# Patient Record
Sex: MALE | Race: WHITE | NOT HISPANIC OR LATINO | URBAN - METROPOLITAN AREA
[De-identification: names, ages, dates, MRNs, and addresses within clinical notes are randomized per-mention and may not be internally consistent; named-entity substitution may affect disease eponyms.]

---

## 2018-02-27 ENCOUNTER — EMERGENCY (EMERGENCY)
Facility: HOSPITAL | Age: 78
LOS: 1 days | End: 2018-02-27
Payer: MEDICARE

## 2018-02-27 PROCEDURE — 70450 CT HEAD/BRAIN W/O DYE: CPT | Mod: 26

## 2018-02-27 PROCEDURE — 71046 X-RAY EXAM CHEST 2 VIEWS: CPT | Mod: 26

## 2018-02-27 PROCEDURE — 93971 EXTREMITY STUDY: CPT | Mod: 26,LT

## 2018-02-27 PROCEDURE — 73564 X-RAY EXAM KNEE 4 OR MORE: CPT | Mod: 26,LT

## 2018-02-27 PROCEDURE — 99285 EMERGENCY DEPT VISIT HI MDM: CPT

## 2018-09-05 ENCOUNTER — OUTPATIENT (OUTPATIENT)
Dept: OUTPATIENT SERVICES | Facility: HOSPITAL | Age: 78
LOS: 1 days | End: 2018-09-05

## 2019-03-08 ENCOUNTER — OUTPATIENT (OUTPATIENT)
Dept: OUTPATIENT SERVICES | Facility: HOSPITAL | Age: 79
LOS: 1 days | End: 2019-03-08

## 2020-07-28 ENCOUNTER — OUTPATIENT (OUTPATIENT)
Dept: OUTPATIENT SERVICES | Facility: HOSPITAL | Age: 80
LOS: 1 days | End: 2020-07-28

## 2021-02-24 ENCOUNTER — OUTPATIENT (OUTPATIENT)
Dept: OUTPATIENT SERVICES | Facility: HOSPITAL | Age: 81
LOS: 1 days | End: 2021-02-24

## 2021-03-01 PROBLEM — Z00.00 ENCOUNTER FOR PREVENTIVE HEALTH EXAMINATION: Status: ACTIVE | Noted: 2021-03-01

## 2021-03-04 ENCOUNTER — APPOINTMENT (OUTPATIENT)
Dept: CARDIOLOGY | Facility: CLINIC | Age: 81
End: 2021-03-04
Payer: MEDICARE

## 2021-03-04 ENCOUNTER — NON-APPOINTMENT (OUTPATIENT)
Age: 81
End: 2021-03-04

## 2021-03-04 VITALS
OXYGEN SATURATION: 98 % | WEIGHT: 198 LBS | SYSTOLIC BLOOD PRESSURE: 118 MMHG | BODY MASS INDEX: 25.41 KG/M2 | HEIGHT: 74 IN | HEART RATE: 55 BPM | DIASTOLIC BLOOD PRESSURE: 62 MMHG | TEMPERATURE: 97.4 F

## 2021-03-04 DIAGNOSIS — Z80.3 FAMILY HISTORY OF MALIGNANT NEOPLASM OF BREAST: ICD-10-CM

## 2021-03-04 DIAGNOSIS — Z78.9 OTHER SPECIFIED HEALTH STATUS: ICD-10-CM

## 2021-03-04 DIAGNOSIS — Z00.00 ENCOUNTER FOR GENERAL ADULT MEDICAL EXAMINATION W/OUT ABNORMAL FINDINGS: ICD-10-CM

## 2021-03-04 PROCEDURE — 93000 ELECTROCARDIOGRAM COMPLETE: CPT

## 2021-03-04 PROCEDURE — 99203 OFFICE O/P NEW LOW 30 MIN: CPT

## 2021-03-04 RX ORDER — OMEPRAZOLE 20 MG/1
20 TABLET, DELAYED RELEASE ORAL
Refills: 0 | Status: ACTIVE | COMMUNITY

## 2021-03-04 NOTE — PHYSICAL EXAM
[General Appearance - Well Developed] : well developed [Normal Appearance] : normal appearance [Well Groomed] : well groomed [General Appearance - Well Nourished] : well nourished [No Deformities] : no deformities [General Appearance - In No Acute Distress] : no acute distress [Normal Conjunctiva] : the conjunctiva exhibited no abnormalities [Eyelids - No Xanthelasma] : the eyelids demonstrated no xanthelasmas [Normal Oral Mucosa] : normal oral mucosa [No Oral Pallor] : no oral pallor [No Oral Cyanosis] : no oral cyanosis [Normal Jugular Venous A Waves Present] : normal jugular venous A waves present [Normal Jugular Venous V Waves Present] : normal jugular venous V waves present [No Jugular Venous Resendiz A Waves] : no jugular venous resendiz A waves [Heart Rate And Rhythm] : heart rate and rhythm were normal [Heart Sounds] : normal S1 and S2 [Murmurs] : no murmurs present [Respiration, Rhythm And Depth] : normal respiratory rhythm and effort [Exaggerated Use Of Accessory Muscles For Inspiration] : no accessory muscle use [Auscultation Breath Sounds / Voice Sounds] : lungs were clear to auscultation bilaterally [Abdomen Soft] : soft [Abdomen Tenderness] : non-tender [Abdomen Mass (___ Cm)] : no abdominal mass palpated [Abnormal Walk] : normal gait [Gait - Sufficient For Exercise Testing] : the gait was sufficient for exercise testing [Nail Clubbing] : no clubbing of the fingernails [Cyanosis, Localized] : no localized cyanosis [Petechial Hemorrhages (___cm)] : no petechial hemorrhages [Skin Color & Pigmentation] : normal skin color and pigmentation [] : no rash [No Venous Stasis] : no venous stasis [Skin Lesions] : no skin lesions [No Skin Ulcers] : no skin ulcer [No Xanthoma] : no  xanthoma was observed [Oriented To Time, Place, And Person] : oriented to person, place, and time [Affect] : the affect was normal [Mood] : the mood was normal [No Anxiety] : not feeling anxious

## 2021-03-04 NOTE — ASSESSMENT
[FreeTextEntry1] : Nathan is an 81-year-old male with medical history detailed above and active medical issues including:\par \par - Atypical chest pain, CAD risk factors.  Patient will have noninvasive testing with a Lexiscan Myoview stress test to assess for obstructive CAD, echocardiogram for LVEF, wall motion, structural heart disease,  carotid and abdominal ultrasound to assess for obstructive PAD. \par \par - Erectile dysfunction\par \par - Reflux GERD\par \par - Dizziness consistant with vertigo\par \par Patient will be seen in cardiology follow-up after noninvasive testing.\par \par Advised patient to follow active lifestyle with regular cardiovascular exercise. Patient educated on lifestyle and diet modification with low sodium low fat diet and avoidance of excessive alcohol. Patient is aware to call with any symptoms or concerns. \par \par Nathan follow-up with Alfredo Rios NP for primary care.\par \par

## 2021-03-04 NOTE — REASON FOR VISIT
[Consultation] : a consultation regarding [FreeTextEntry2] : WEBSTER, neck pain, dizziness [FreeTextEntry1] : Nathan is an 81-year-old male with history of reflux GERD, ED, depression, Lyme's, palpitations, dizziness.\par \par Patient has dyspnea with moderate exertion.  Patient has neck pain predominantly at rest lasting less than 1 minute.  Pain is nonradiating nonreproducible.  Cardiovascular review of symptoms is negative for exertional chest pain, palpitations or syncope.  No PND or orthopnea leg edema.  No bleeding or black stool.\par \par No exercise routine.  Patient is walking less than 5 minutes able to complete a treadmill stress test.  Patient will be scheduled for pharmacologic stress test.\par \par EKG 3/4/2021 sinus bradycardia 53 bpm\par \par Labs 7/28/2020 fasting cholesterol 162, HDL 43, , triglycerides 70, magnesium 2.1, normal LFT, CBC, BMP, TSH

## 2021-03-05 ENCOUNTER — APPOINTMENT (OUTPATIENT)
Dept: CARDIOLOGY | Facility: CLINIC | Age: 81
End: 2021-03-05
Payer: MEDICARE

## 2021-03-05 PROCEDURE — 93979 VASCULAR STUDY: CPT

## 2021-03-05 PROCEDURE — 93306 TTE W/DOPPLER COMPLETE: CPT

## 2021-03-05 PROCEDURE — 93880 EXTRACRANIAL BILAT STUDY: CPT

## 2021-03-09 ENCOUNTER — APPOINTMENT (OUTPATIENT)
Dept: CARDIOLOGY | Facility: CLINIC | Age: 81
End: 2021-03-09

## 2021-03-12 ENCOUNTER — APPOINTMENT (OUTPATIENT)
Dept: CARDIOLOGY | Facility: CLINIC | Age: 81
End: 2021-03-12
Payer: MEDICARE

## 2021-03-12 PROCEDURE — 78452 HT MUSCLE IMAGE SPECT MULT: CPT

## 2021-03-12 PROCEDURE — 93015 CV STRESS TEST SUPVJ I&R: CPT

## 2021-03-12 PROCEDURE — A9502: CPT

## 2021-03-19 ENCOUNTER — TRANSCRIPTION ENCOUNTER (OUTPATIENT)
Age: 81
End: 2021-03-19

## 2021-03-19 ENCOUNTER — APPOINTMENT (OUTPATIENT)
Dept: CARDIOLOGY | Facility: CLINIC | Age: 81
End: 2021-03-19
Payer: MEDICARE

## 2021-03-19 VITALS
SYSTOLIC BLOOD PRESSURE: 118 MMHG | WEIGHT: 193 LBS | HEART RATE: 51 BPM | BODY MASS INDEX: 24 KG/M2 | TEMPERATURE: 97.7 F | OXYGEN SATURATION: 96 % | HEIGHT: 75 IN | DIASTOLIC BLOOD PRESSURE: 62 MMHG

## 2021-03-19 PROCEDURE — 99214 OFFICE O/P EST MOD 30 MIN: CPT

## 2021-03-19 RX ORDER — ASPIRIN 81 MG/1
81 TABLET ORAL DAILY
Refills: 0 | Status: DISCONTINUED | COMMUNITY
End: 2021-03-19

## 2021-03-19 NOTE — ASSESSMENT
[FreeTextEntry1] : Nathan is an 81-year-old male with medical history detailed above and active medical issues including:\par \par - Atypical chest pain, normal perfusion Myoview stress test normal LVEF March 2021\par \par - Erectile dysfunction\par \par - Reflux GERD\par \par - Dizziness consistent with vertigo\par \par Patient will be seen in cardiology follow-up after 1 year with echocardiogram\par \par Advised patient to follow active lifestyle with regular cardiovascular exercise. Patient educated on lifestyle and diet modification with low sodium low fat diet and avoidance of excessive alcohol. Patient is aware to call with any symptoms or concerns. \par \par Nathan follow-up with Alfredo Rios NP for primary care.\par \par

## 2021-03-19 NOTE — REASON FOR VISIT
[Consultation] : a consultation regarding [FreeTextEntry2] : noninvasive testing for WEBSTER, neck pain, dizziness [FreeTextEntry1] : Nathan is an 81-year-old male with history of reflux GERD, ED, depression, Lyme's, palpitations, dizziness.\par \par Patient has dyspnea with moderate exertion unchanged.  Patient has neck pain predominantly at rest lasting less than 1 minute.  Pain is nonradiating nonreproducible.  Cardiovascular review of symptoms is negative for exertional chest pain, palpitations or syncope.  No PND or orthopnea leg edema.  No bleeding or black stool.\par \par No exercise routine.  Patient is walking less than 5 minutes able to complete a treadmill stress test.  Patient will be scheduled for pharmacologic stress test.\par \par Lexiscan Myoview stress test March 2021, LVEF 63%, normal perfusion, nonischemic EKG response, no chest pain, baseline sinus rhythm NSST\par \par Echocardiogram March 2021 LVEF 65%, mild buckling mitral valve leaflet with mild MR, normal RVSP\par \par Carotid and abdominal ultrasound March 2021 mild nonobstructive plaque, normal abdominal aortic size.\par \par EKG 3/4/2021 sinus bradycardia 53 bpm\par \par Labs 7/28/2020 fasting cholesterol 162, HDL 43, , triglycerides 70, magnesium 2.1, normal LFT, CBC, BMP, TSH

## 2021-03-19 NOTE — PHYSICAL EXAM
[General Appearance - Well Developed] : well developed [Normal Appearance] : normal appearance [Well Groomed] : well groomed [General Appearance - Well Nourished] : well nourished [No Deformities] : no deformities [General Appearance - In No Acute Distress] : no acute distress [Normal Conjunctiva] : the conjunctiva exhibited no abnormalities [Eyelids - No Xanthelasma] : the eyelids demonstrated no xanthelasmas [Normal Oral Mucosa] : normal oral mucosa [No Oral Pallor] : no oral pallor [No Oral Cyanosis] : no oral cyanosis [Normal Jugular Venous A Waves Present] : normal jugular venous A waves present [Normal Jugular Venous V Waves Present] : normal jugular venous V waves present [No Jugular Venous Resendiz A Waves] : no jugular venous resendiz A waves [Respiration, Rhythm And Depth] : normal respiratory rhythm and effort [Exaggerated Use Of Accessory Muscles For Inspiration] : no accessory muscle use [Auscultation Breath Sounds / Voice Sounds] : lungs were clear to auscultation bilaterally [Heart Rate And Rhythm] : heart rate and rhythm were normal [Heart Sounds] : normal S1 and S2 [Murmurs] : no murmurs present [Abdomen Soft] : soft [Abdomen Tenderness] : non-tender [Abdomen Mass (___ Cm)] : no abdominal mass palpated [Abnormal Walk] : normal gait [Gait - Sufficient For Exercise Testing] : the gait was sufficient for exercise testing [Nail Clubbing] : no clubbing of the fingernails [Cyanosis, Localized] : no localized cyanosis [Petechial Hemorrhages (___cm)] : no petechial hemorrhages [Skin Color & Pigmentation] : normal skin color and pigmentation [] : no rash [No Venous Stasis] : no venous stasis [Skin Lesions] : no skin lesions [No Skin Ulcers] : no skin ulcer [No Xanthoma] : no  xanthoma was observed [Oriented To Time, Place, And Person] : oriented to person, place, and time [Affect] : the affect was normal [Mood] : the mood was normal [No Anxiety] : not feeling anxious

## 2022-03-09 ENCOUNTER — APPOINTMENT (OUTPATIENT)
Dept: CARDIOLOGY | Facility: CLINIC | Age: 82
End: 2022-03-09

## 2022-03-24 ENCOUNTER — NON-APPOINTMENT (OUTPATIENT)
Age: 82
End: 2022-03-24

## 2022-03-24 ENCOUNTER — APPOINTMENT (OUTPATIENT)
Dept: CARDIOLOGY | Facility: CLINIC | Age: 82
End: 2022-03-24
Payer: MEDICARE

## 2022-03-24 VITALS
DIASTOLIC BLOOD PRESSURE: 70 MMHG | HEIGHT: 75 IN | OXYGEN SATURATION: 95 % | BODY MASS INDEX: 24.25 KG/M2 | HEART RATE: 71 BPM | WEIGHT: 195 LBS | TEMPERATURE: 97.8 F | SYSTOLIC BLOOD PRESSURE: 120 MMHG

## 2022-03-24 PROCEDURE — 93000 ELECTROCARDIOGRAM COMPLETE: CPT

## 2022-03-24 PROCEDURE — 99214 OFFICE O/P EST MOD 30 MIN: CPT

## 2022-03-24 NOTE — PHYSICAL EXAM
[General Appearance - Well Developed] : well developed [Normal Appearance] : normal appearance [Well Groomed] : well groomed [General Appearance - Well Nourished] : well nourished [No Deformities] : no deformities [General Appearance - In No Acute Distress] : no acute distress [Eyelids - No Xanthelasma] : the eyelids demonstrated no xanthelasmas [Normal Oral Mucosa] : normal oral mucosa [No Oral Pallor] : no oral pallor [No Oral Cyanosis] : no oral cyanosis [Normal Jugular Venous A Waves Present] : normal jugular venous A waves present [Normal Jugular Venous V Waves Present] : normal jugular venous V waves present [No Jugular Venous Resendiz A Waves] : no jugular venous resendiz A waves [Respiration, Rhythm And Depth] : normal respiratory rhythm and effort [Exaggerated Use Of Accessory Muscles For Inspiration] : no accessory muscle use [Auscultation Breath Sounds / Voice Sounds] : lungs were clear to auscultation bilaterally [Heart Rate And Rhythm] : heart rate and rhythm were normal [Heart Sounds] : normal S1 and S2 [Murmurs] : no murmurs present [Abdomen Soft] : soft [Abdomen Tenderness] : non-tender [Abdomen Mass (___ Cm)] : no abdominal mass palpated [Abnormal Walk] : normal gait [Gait - Sufficient For Exercise Testing] : the gait was sufficient for exercise testing [Nail Clubbing] : no clubbing of the fingernails [Cyanosis, Localized] : no localized cyanosis [Petechial Hemorrhages (___cm)] : no petechial hemorrhages [Skin Color & Pigmentation] : normal skin color and pigmentation [] : no rash [No Venous Stasis] : no venous stasis [Skin Lesions] : no skin lesions [No Skin Ulcers] : no skin ulcer [No Xanthoma] : no  xanthoma was observed [Oriented To Time, Place, And Person] : oriented to person, place, and time [Affect] : the affect was normal [Mood] : the mood was normal [No Anxiety] : not feeling anxious [Well Developed] : well developed [Well Nourished] : well nourished [No Acute Distress] : no acute distress [Normal Conjunctiva] : normal conjunctiva [Normal Venous Pressure] : normal venous pressure [No Carotid Bruit] : no carotid bruit [Normal S1, S2] : normal S1, S2 [No Murmur] : no murmur [No Rub] : no rub [No Gallop] : no gallop [Clear Lung Fields] : clear lung fields [Good Air Entry] : good air entry [No Respiratory Distress] : no respiratory distress  [Soft] : abdomen soft [Non Tender] : non-tender [No Masses/organomegaly] : no masses/organomegaly [Normal Bowel Sounds] : normal bowel sounds [Normal Gait] : normal gait [No Edema] : no edema [No Cyanosis] : no cyanosis [No Clubbing] : no clubbing [No Varicosities] : no varicosities [No Rash] : no rash [No Skin Lesions] : no skin lesions [Moves all extremities] : moves all extremities [No Focal Deficits] : no focal deficits [Normal Speech] : normal speech [Alert and Oriented] : alert and oriented [Normal memory] : normal memory

## 2022-03-24 NOTE — REASON FOR VISIT
[Consultation] : a consultation regarding [FreeTextEntry1] : Nathan is an 82-year-old male with history of reflux GERD, ED, depression, Lyme's, palpitations, dizziness.\par \par Patient has dyspnea with moderate exertion unchanged.  Patient has reproducible neck pain predominantly at rest lasting less than 1 minute.  Cardiovascular review of symptoms is negative for exertional chest pain, palpitations or syncope.  No PND or orthopnea leg edema.  No bleeding or black stool.\par \par No exercise routine.  Patient is walking 15 minutes without exertional chest pain\par \par Lexiscan Myoview stress test March 2021, LVEF 63%, normal perfusion, nonischemic EKG response, no chest pain, baseline sinus rhythm NSST\par \par Echocardiogram March 2021 LVEF 65%, mild buckling mitral valve leaflet with mild MR, normal RVSP\par \par Carotid and abdominal ultrasound March 2021 mild nonobstructive plaque, normal abdominal aortic size.\par \par EKG 3/4/2021 sinus bradycardia 53 bpm\par \par Labs 7/28/2020 fasting cholesterol 162, HDL 43, , triglycerides 70, magnesium 2.1, normal LFT, CBC, BMP, TSH [FreeTextEntry2] : noninvasive testing for WEBSTER, neck pain, dizziness

## 2022-03-24 NOTE — ASSESSMENT
[FreeTextEntry1] : Nathan is an 82-year-old male with medical history detailed above and active medical issues including:\par \par - Atypical chest pain, normal perfusion Myoview stress test normal LVEF March 2021\par \par - Recurrent reproducible neck pain, follow-up with PCP, orthopedic surgery\par \par - Erectile dysfunction\par \par - Reflux GERD\par \par - Dizziness consistent with vertigo\par \par Echocardiogram ordered to evaluate for structural heart disease, carotid and abdominal ultrasound to evaluate for PAD with TEB follow-up.  Patient will be seen in cardiology follow-up  6 months.  Patient currently not on cardiac medication.\par \par Advised patient to follow active lifestyle with regular cardiovascular exercise. Patient educated on lifestyle and diet modification with low sodium low fat diet and avoidance of excessive alcohol. Patient is aware to call with any symptoms or concerns. \par \par Nathan follow-up with Dr Leeanna Ramirez for primary care.\par \par

## 2022-03-25 ENCOUNTER — APPOINTMENT (OUTPATIENT)
Dept: CARDIOLOGY | Facility: CLINIC | Age: 82
End: 2022-03-25
Payer: MEDICARE

## 2022-03-25 PROCEDURE — 93306 TTE W/DOPPLER COMPLETE: CPT

## 2022-03-25 PROCEDURE — 93880 EXTRACRANIAL BILAT STUDY: CPT

## 2022-03-25 PROCEDURE — 93979 VASCULAR STUDY: CPT

## 2022-04-05 ENCOUNTER — APPOINTMENT (OUTPATIENT)
Dept: CARDIOLOGY | Facility: CLINIC | Age: 82
End: 2022-04-05
Payer: MEDICARE

## 2022-04-05 PROCEDURE — 99443: CPT | Mod: 95

## 2022-04-05 NOTE — REASON FOR VISIT
[Consultation] : a consultation regarding [FreeTextEntry1] : Nathan is an 82-year-old male with history of reflux GERD, ED, depression, Lyme's, palpitations, dizziness.\par \par Patient had a brief syncopal episode with fall after spending time in a hot bath while getting out of the bathtub, likely vasovagal event.  Recommended increased oral hydration with electrolyte suppliment drinks, avoid caffeine and alcohol intake.\par \par Patient has dyspnea with moderate exertion unchanged.  Patient has reproducible neck pain predominantly at rest lasting less than 1 minute.  Cardiovascular review of symptoms is negative for exertional chest pain, palpitations or syncope.  No PND or orthopnea leg edema.  No bleeding or black stool.\par \par No exercise routine.  Patient is walking 15 minutes without exertional chest pain\par \par Echocardiogram March 2022 LVEF 60%, mild MR, normal RVSP.\par \par Carotid and abdominal ultrasound March 2022, mild nonobstructive plaque, normal abdominal aortic size. \par \par Lexiscan Myoview stress test March 2021, LVEF 63%, normal perfusion, nonischemic EKG response, no chest pain, baseline sinus rhythm NSST\par \par Echocardiogram March 2021 LVEF 65%, mild buckling mitral valve leaflet with mild MR, normal RVSP\par \par Carotid and abdominal ultrasound March 2021 mild nonobstructive plaque, normal abdominal aortic size.\par \par EKG 3/4/2021 sinus bradycardia 53 bpm\par \par Labs 7/28/2020 fasting cholesterol 162, HDL 43, , triglycerides 70, magnesium 2.1, normal LFT, CBC, BMP, TSH [FreeTextEntry2] : noninvasive testing for WEBSTER, neck pain, dizziness

## 2022-04-05 NOTE — ASSESSMENT
[FreeTextEntry1] : Nathan is an 82-year-old male with medical history detailed above and active medical issues including:\par \par - Brief syncopal episode with fall after spending time in a hot bath while getting out of the bathtub, likely vasovagal event.  Recommended increased oral hydration with electrolyte suppliment drinks, avoid caffeine and alcohol intake.\par \par - Atypical chest pain, normal perfusion Myoview stress test normal LVEF March 2021\par \par - Recurrent reproducible neck pain, follow-up with PCP, orthopedic surgery\par \par - Erectile dysfunction\par \par - Reflux GERD\par \par - Dizziness consistent with vertigo\par \par Patient will be seen in cardiology follow-up  6 months.  Patient currently not on cardiac medication.\par \par Advised patient to follow active lifestyle with regular cardiovascular exercise. Patient educated on lifestyle and diet modification with low sodium low fat diet and avoidance of excessive alcohol. Patient is aware to call with any symptoms or concerns. \par \par Nathan follow-up with Dr Leeanna Ramirez for primary care.\par \par

## 2022-04-05 NOTE — PHYSICAL EXAM
[Well Developed] : well developed [Well Nourished] : well nourished [No Acute Distress] : no acute distress [Normal Venous Pressure] : normal venous pressure [No Carotid Bruit] : no carotid bruit [Normal S1, S2] : normal S1, S2 [No Murmur] : no murmur [No Rub] : no rub [No Gallop] : no gallop [Clear Lung Fields] : clear lung fields [Good Air Entry] : good air entry [No Respiratory Distress] : no respiratory distress  [Soft] : abdomen soft [Non Tender] : non-tender [No Masses/organomegaly] : no masses/organomegaly [Normal Bowel Sounds] : normal bowel sounds [Normal Gait] : normal gait [No Edema] : no edema [No Cyanosis] : no cyanosis [No Clubbing] : no clubbing [No Varicosities] : no varicosities [No Rash] : no rash [No Skin Lesions] : no skin lesions [Moves all extremities] : moves all extremities [No Focal Deficits] : no focal deficits [Normal Speech] : normal speech [Alert and Oriented] : alert and oriented [Normal memory] : normal memory [General Appearance - Well Developed] : well developed [Normal Appearance] : normal appearance [Well Groomed] : well groomed [General Appearance - Well Nourished] : well nourished [No Deformities] : no deformities [General Appearance - In No Acute Distress] : no acute distress [Normal Conjunctiva] : the conjunctiva exhibited no abnormalities [Eyelids - No Xanthelasma] : the eyelids demonstrated no xanthelasmas [Normal Oral Mucosa] : normal oral mucosa [No Oral Pallor] : no oral pallor [No Oral Cyanosis] : no oral cyanosis [Normal Jugular Venous A Waves Present] : normal jugular venous A waves present [Normal Jugular Venous V Waves Present] : normal jugular venous V waves present [No Jugular Venous Resendiz A Waves] : no jugular venous resendiz A waves [Respiration, Rhythm And Depth] : normal respiratory rhythm and effort [Exaggerated Use Of Accessory Muscles For Inspiration] : no accessory muscle use [Auscultation Breath Sounds / Voice Sounds] : lungs were clear to auscultation bilaterally [Heart Rate And Rhythm] : heart rate and rhythm were normal [Heart Sounds] : normal S1 and S2 [Murmurs] : no murmurs present [Abdomen Soft] : soft [Abdomen Tenderness] : non-tender [Abdomen Mass (___ Cm)] : no abdominal mass palpated [Abnormal Walk] : normal gait [Gait - Sufficient For Exercise Testing] : the gait was sufficient for exercise testing [Nail Clubbing] : no clubbing of the fingernails [Cyanosis, Localized] : no localized cyanosis [Petechial Hemorrhages (___cm)] : no petechial hemorrhages [Skin Color & Pigmentation] : normal skin color and pigmentation [] : no rash [No Venous Stasis] : no venous stasis [Skin Lesions] : no skin lesions [No Skin Ulcers] : no skin ulcer [No Xanthoma] : no  xanthoma was observed [Oriented To Time, Place, And Person] : oriented to person, place, and time [Affect] : the affect was normal [Mood] : the mood was normal [No Anxiety] : not feeling anxious

## 2022-07-05 ENCOUNTER — APPOINTMENT (OUTPATIENT)
Dept: NEUROSURGERY | Facility: CLINIC | Age: 82
End: 2022-07-05

## 2022-07-05 VITALS
HEIGHT: 75 IN | SYSTOLIC BLOOD PRESSURE: 100 MMHG | TEMPERATURE: 97.9 F | BODY MASS INDEX: 24.25 KG/M2 | DIASTOLIC BLOOD PRESSURE: 76 MMHG | WEIGHT: 195 LBS

## 2022-07-05 DIAGNOSIS — Z78.9 OTHER SPECIFIED HEALTH STATUS: ICD-10-CM

## 2022-07-05 PROCEDURE — 99203 OFFICE O/P NEW LOW 30 MIN: CPT

## 2022-07-05 NOTE — ASSESSMENT
[FreeTextEntry1] : Discussed the history, physical examination findings, and recent imaging with the patient with all questions answered.  The radiographs reveal an L3 compression fracture deformity which seems to be related to the patient's injury nearly 9 weeks ago.  Discussed with the patient that he would need an MRI for confirmation, however the patient does not want any additional imaging at this time.  LSO brace recommended for comfort only and to reduce truncal mobility.  Back owner's manual provided.  Discussed that there is no role for neurosurgical intervention at this time.  The patient will follow up as needed

## 2022-07-05 NOTE — REASON FOR VISIT
[New Patient Visit] : a new patient visit [FreeTextEntry1] : Pt is here with lower back pain from a fall.

## 2022-07-05 NOTE — PHYSICAL EXAM
[General Appearance - Alert] : alert [General Appearance - In No Acute Distress] : in no acute distress [General Appearance - Well Nourished] : well nourished [General Appearance - Well Developed] : well developed [General Appearance - Well-Appearing] : healthy appearing [] : normal voice and communication [Oriented To Time, Place, And Person] : oriented to person, place, and time [Impaired Insight] : insight and judgment were intact [Affect] : the affect was normal [Mood] : the mood was normal [Memory Recent] : recent memory was not impaired [Memory Remote] : remote memory was not impaired [Person] : oriented to person [Place] : oriented to place [Time] : oriented to time [Motor Tone] : muscle tone was normal in all four extremities [Motor Strength] : muscle strength was normal in all four extremities [Involuntary Movements] : no involuntary movements were seen [No Muscle Atrophy] : normal bulk in all four extremities [Abnormal Walk] : normal gait [Normal] : normal [Intact] : all motor groups within normal limits of strength and tone bilaterally [FreeTextEntry8] : Patient ambulates unassisted [Straight-Leg Raise Test - Left] : straight leg raise of the left leg was negative [Straight-Leg Raise Test - Right] : straight leg raise  of the right leg was negative

## 2022-07-05 NOTE — DATA REVIEWED
[de-identified] : Were reviewed of the lumbar spine/pelvis -7/5/2022: Appears to be an L3 compression fracture deformity; no evidence of bony retropulsion.

## 2022-07-05 NOTE — HISTORY OF PRESENT ILLNESS
[< 3 months] : less than 3 months [FreeTextEntry1] : Back pain [de-identified] : 82-year-old male presents to the neurosurgery office for initial office visit for evaluation of back pain.  The patient states that symptoms have been present for about 9 weeks which occurred as a result of a fall.  Patient reports taking a hot bath and felt lightheaded and fell back.  The patient denies any head trauma.  He did not seek further evaluation by going to the emergency department.  The patient hoped that symptoms would improve over time, however his back pain is still present.  He denies any lower extremity radicular symptoms.  He reports that he has no imaging available for review.

## 2022-09-22 ENCOUNTER — APPOINTMENT (OUTPATIENT)
Dept: CARDIOLOGY | Facility: CLINIC | Age: 82
End: 2022-09-22

## 2022-09-22 VITALS
DIASTOLIC BLOOD PRESSURE: 90 MMHG | TEMPERATURE: 97.9 F | WEIGHT: 191 LBS | SYSTOLIC BLOOD PRESSURE: 160 MMHG | OXYGEN SATURATION: 97 % | HEART RATE: 53 BPM | HEIGHT: 75 IN | BODY MASS INDEX: 23.75 KG/M2

## 2022-09-22 PROCEDURE — 99215 OFFICE O/P EST HI 40 MIN: CPT

## 2022-09-22 NOTE — REASON FOR VISIT
[Other: ____] : [unfilled] [FreeTextEntry1] : Nathan is an 82-year-old male with history of reflux GERD, ED, depression, Lyme's, palpitations, dizziness.\par \par Patient had a brief syncopal episode with fall after spending time in a hot bath while getting out of the bathtub, likely vasovagal event.  Recommended increased oral hydration with electrolyte suppliment drinks, avoid caffeine and alcohol intake.\par \par Patient has dyspnea with moderate exertion unchanged.  Patient has reproducible neck pain predominantly at rest lasting less than 1 minute.  Cardiovascular review of symptoms is negative for exertional chest pain, palpitations or syncope.  No PND or orthopnea leg edema.  No bleeding or black stool.\par \par No exercise routine.  Patient is walking 15 minutes without exertional chest pain\par \par Echocardiogram March 2022 LVEF 60%, mild MR, normal RVSP.\par \par Carotid and abdominal ultrasound March 2022, mild nonobstructive plaque, normal abdominal aortic size. \par \par Lexiscan Myoview stress test March 2021, LVEF 63%, normal perfusion, nonischemic EKG response, no chest pain, baseline sinus rhythm NSST\par \par Echocardiogram March 2021 LVEF 65%, mild buckling mitral valve leaflet with mild MR, normal RVSP\par \par Carotid and abdominal ultrasound March 2021 mild nonobstructive plaque, normal abdominal aortic size.\par \par EKG 3/4/2021 sinus bradycardia 53 bpm\par \par Labs 7/28/2020 fasting cholesterol 162, HDL 43, , triglycerides 70, magnesium 2.1, normal LFT, CBC, BMP, TSH

## 2022-09-22 NOTE — ASSESSMENT
[FreeTextEntry1] : Nathan is an 82-year-old male with medical history detailed above and active medical issues including:\par \par - Brief syncopal episode with fall after spending time in a hot bath while getting out of the bathtub, likely vasovagal event.  Recommended increased oral hydration with electrolyte suppliment drinks, avoid caffeine and alcohol intake.\par \par - Isolated office BP elevation, patient will follow home BPs to confirm at guideline goal.  Discussed moderate exercise, low salt diet, avoidance of alcohol and caffeine.\par \par - Atypical chest pain, normal perfusion Myoview stress test normal LVEF March 2021\par \par - Recurrent reproducible neck pain, follow-up with PCP, orthopedic surgery\par \par - Erectile dysfunction\par \par - Reflux GERD\par \par - Dizziness consistent with vertigo\par \par Patient will be seen in cardiology follow-up  6 months same-day echocardiogram.  Patient currently not on cardiac medication.\par \par Advised patient to follow active lifestyle with regular cardiovascular exercise. Patient educated on lifestyle and diet modification with low sodium low fat diet and avoidance of excessive alcohol. Patient is aware to call with any symptoms or concerns. \par \par Nathan follow-up with Dr Leeanna Ramirez for primary care.\par \par

## 2023-01-10 ENCOUNTER — APPOINTMENT (OUTPATIENT)
Dept: RADIOLOGY | Facility: CLINIC | Age: 83
End: 2023-01-10
Payer: OTHER GOVERNMENT

## 2023-01-10 PROCEDURE — 72040 X-RAY EXAM NECK SPINE 2-3 VW: CPT

## 2023-01-19 ENCOUNTER — APPOINTMENT (OUTPATIENT)
Dept: CARDIOLOGY | Facility: CLINIC | Age: 83
End: 2023-01-19
Payer: MEDICARE

## 2023-01-19 VITALS
TEMPERATURE: 98 F | BODY MASS INDEX: 23.87 KG/M2 | DIASTOLIC BLOOD PRESSURE: 72 MMHG | SYSTOLIC BLOOD PRESSURE: 124 MMHG | HEART RATE: 62 BPM | HEIGHT: 75 IN | WEIGHT: 192 LBS | OXYGEN SATURATION: 96 %

## 2023-01-19 PROCEDURE — 99215 OFFICE O/P EST HI 40 MIN: CPT

## 2023-01-19 RX ORDER — ASPIRIN 81 MG/1
81 TABLET, CHEWABLE ORAL
Refills: 0 | Status: ACTIVE | COMMUNITY

## 2023-01-19 RX ORDER — UBIDECARENONE/VIT E ACET 100MG-5
CAPSULE ORAL
Refills: 0 | Status: DISCONTINUED | COMMUNITY
End: 2023-01-19

## 2023-01-19 RX ORDER — DIPHENHYDRAMINE HYDROCHLORIDE, ZINC ACETATE 20; 1 MG/G; MG/G
CREAM TOPICAL
Refills: 0 | Status: DISCONTINUED | COMMUNITY
End: 2023-01-19

## 2023-01-19 NOTE — REASON FOR VISIT
Please have her stop temporarily and see if this makes a difference.  She could also split up the dose and give it 2 or 3 times a day instead.     [Other: ____] : [unfilled] [FreeTextEntry1] : Nathan is an 82-year-old male with history of reflux GERD, ED, depression, Lyme's, palpitations, dizziness.\par \par Patient had a brief syncopal episode with fall after spending time in a hot bath while getting out of the bathtub, likely vasovagal event.  Recommended increased oral hydration with electrolyte suppliment drinks, avoid caffeine and alcohol intake.\par \par Patient has dyspnea with moderate exertion unchanged.  Patient has reproducible neck pain and chest pain predominantly at rest lasting less than 1 minute.  Cardiovascular review of symptoms is negative for exertional chest pain, palpitations or syncope.  No PND or orthopnea leg edema.  No bleeding or black stool.\par \par No exercise routine.  Patient is walking 15 minutes without exertional chest pain\par \par Echocardiogram March 2022 LVEF 60%, mild MR, normal RVSP.\par \par Carotid and abdominal ultrasound March 2022, mild nonobstructive plaque, normal abdominal aortic size. \par \par Lexiscan Myoview stress test March 2021, LVEF 63%, normal perfusion, nonischemic EKG response, no chest pain, baseline sinus rhythm NSST\par \par Echocardiogram March 2021 LVEF 65%, mild buckling mitral valve leaflet with mild MR, normal RVSP\par \par Carotid and abdominal ultrasound March 2021 mild nonobstructive plaque, normal abdominal aortic size.\par \par EKG 3/4/2021 sinus bradycardia 53 bpm\par \par Labs 7/28/2020 fasting cholesterol 162, HDL 43, , triglycerides 70, magnesium 2.1, normal LFT, CBC, BMP, TSH

## 2023-01-19 NOTE — ASSESSMENT
[FreeTextEntry1] : Nathan is an 82-year-old male with medical history detailed above and active medical issues including:\par \par - -Atypical chest pain, multiple CAD risk factors.  Patient will have noninvasive testing with a exercise Myoview stress test to assess for obstructive CAD, exercise-induced arrhythmia,  blood pressure response, echocardiogram for LVEF, wall motion, structural heart disease. \par \par - Brief syncopal episode with fall after spending time in a hot bath while getting out of the bathtub, likely vasovagal event.  Recommended increased oral hydration with electrolyte suppliment drinks, avoid caffeine and alcohol intake.\par \par - Hypertension on amlodipine office BP at guideline goal.\par \par - Recurrent reproducible neck pain, follow-up with PCP, orthopedic surgery\par \par - Erectile dysfunction\par \par - Reflux GERD\par \par - Dizziness consistent with vertigo\par \par Patient will be seen in cardiology follow-up after noninvasive testing. \par \par Advised patient to follow active lifestyle with regular cardiovascular exercise. Patient educated on lifestyle and diet modification with low sodium low fat diet and avoidance of excessive alcohol. Patient is aware to call with any symptoms or concerns. \par \par Nathan follow-up with Dr Leeanna Ramirez for primary care.\par \par

## 2023-02-28 ENCOUNTER — APPOINTMENT (OUTPATIENT)
Dept: CARDIOLOGY | Facility: CLINIC | Age: 83
End: 2023-02-28
Payer: MEDICARE

## 2023-02-28 PROCEDURE — 78452 HT MUSCLE IMAGE SPECT MULT: CPT

## 2023-02-28 PROCEDURE — 93306 TTE W/DOPPLER COMPLETE: CPT

## 2023-02-28 PROCEDURE — A9502: CPT

## 2023-02-28 PROCEDURE — 93015 CV STRESS TEST SUPVJ I&R: CPT

## 2023-03-22 PROBLEM — M54.2 NECK PAIN: Status: ACTIVE | Noted: 2021-03-04

## 2023-03-22 PROBLEM — S32.030A COMPRESSION FRACTURE OF L3 VERTEBRA: Status: ACTIVE | Noted: 2022-07-05

## 2023-03-22 NOTE — PHYSICAL EXAM
[Well Developed] : well developed [No Acute Distress] : no acute distress [Well Nourished] : well nourished [Normal Venous Pressure] : normal venous pressure [No Carotid Bruit] : no carotid bruit [Normal S1, S2] : normal S1, S2 [No Rub] : no rub [No Murmur] : no murmur [No Gallop] : no gallop [Clear Lung Fields] : clear lung fields [Good Air Entry] : good air entry [No Respiratory Distress] : no respiratory distress  [Soft] : abdomen soft [Non Tender] : non-tender [No Masses/organomegaly] : no masses/organomegaly [Normal Bowel Sounds] : normal bowel sounds [Normal Gait] : normal gait [No Edema] : no edema [No Cyanosis] : no cyanosis [No Clubbing] : no clubbing [No Varicosities] : no varicosities [No Rash] : no rash [No Skin Lesions] : no skin lesions [Moves all extremities] : moves all extremities [No Focal Deficits] : no focal deficits [Normal Speech] : normal speech [Alert and Oriented] : alert and oriented [Normal memory] : normal memory [General Appearance - Well Developed] : well developed [Normal Appearance] : normal appearance [Well Groomed] : well groomed [General Appearance - Well Nourished] : well nourished [No Deformities] : no deformities [General Appearance - In No Acute Distress] : no acute distress [Normal Conjunctiva] : the conjunctiva exhibited no abnormalities [Eyelids - No Xanthelasma] : the eyelids demonstrated no xanthelasmas [Normal Oral Mucosa] : normal oral mucosa [No Oral Pallor] : no oral pallor [No Oral Cyanosis] : no oral cyanosis [Normal Jugular Venous A Waves Present] : normal jugular venous A waves present [Normal Jugular Venous V Waves Present] : normal jugular venous V waves present [No Jugular Venous Resendiz A Waves] : no jugular venous resendiz A waves [Respiration, Rhythm And Depth] : normal respiratory rhythm and effort [Exaggerated Use Of Accessory Muscles For Inspiration] : no accessory muscle use [Auscultation Breath Sounds / Voice Sounds] : lungs were clear to auscultation bilaterally [Heart Rate And Rhythm] : heart rate and rhythm were normal [Heart Sounds] : normal S1 and S2 [Murmurs] : no murmurs present [Abdomen Soft] : soft [Abdomen Tenderness] : non-tender [Abdomen Mass (___ Cm)] : no abdominal mass palpated [Abnormal Walk] : normal gait [Gait - Sufficient For Exercise Testing] : the gait was sufficient for exercise testing [Nail Clubbing] : no clubbing of the fingernails [Cyanosis, Localized] : no localized cyanosis [Petechial Hemorrhages (___cm)] : no petechial hemorrhages [Skin Color & Pigmentation] : normal skin color and pigmentation [] : no rash [No Venous Stasis] : no venous stasis [Skin Lesions] : no skin lesions [No Skin Ulcers] : no skin ulcer [No Xanthoma] : no  xanthoma was observed [Oriented To Time, Place, And Person] : oriented to person, place, and time [Affect] : the affect was normal [Mood] : the mood was normal [No Anxiety] : not feeling anxious

## 2023-03-28 ENCOUNTER — APPOINTMENT (OUTPATIENT)
Dept: CARDIOLOGY | Facility: CLINIC | Age: 83
End: 2023-03-28
Payer: MEDICARE

## 2023-03-28 VITALS
SYSTOLIC BLOOD PRESSURE: 132 MMHG | OXYGEN SATURATION: 97 % | DIASTOLIC BLOOD PRESSURE: 84 MMHG | HEART RATE: 52 BPM | WEIGHT: 194 LBS | BODY MASS INDEX: 24.12 KG/M2 | HEIGHT: 75 IN

## 2023-03-28 DIAGNOSIS — M54.2 CERVICALGIA: ICD-10-CM

## 2023-03-28 DIAGNOSIS — S32.030A WEDGE COMPRESSION FRACTURE OF THIRD LUMBAR VERTEBRA, INITIAL ENCOUNTER FOR CLOSED FRACTURE: ICD-10-CM

## 2023-03-28 PROCEDURE — 99215 OFFICE O/P EST HI 40 MIN: CPT

## 2023-03-28 NOTE — REASON FOR VISIT
[Other: ____] : [unfilled] [FreeTextEntry1] : Nathan is an 83-year-old male with history of reflux GERD, ED, depression, Lyme's, palpitations, dizziness.\par \par Patient had a brief syncopal episode with fall after spending time in a hot bath while getting out of the bathtub, likely vasovagal event.  Recommended increased oral hydration with electrolyte suppliment drinks, avoid caffeine and alcohol intake.\par \par Patient has dyspnea with moderate exertion unchanged.  Patient has reproducible neck pain and chest pain predominantly at rest lasting less than 1 minute.  Cardiovascular review of symptoms is negative for exertional chest pain, palpitations or syncope.  No PND or orthopnea leg edema.  No bleeding or black stool.\par \par No exercise routine.  Patient is walking 15 minutes without exertional chest pain\par \par Lexiscan Myoview stress test March 2023 normal LVEF with normal perfusion.\par \par Echocardiogram March 2023 normal LVEF normal Doppler\par \par Echocardiogram March 2022 LVEF 60%, mild MR, normal RVSP.\par \par Carotid and abdominal ultrasound March 2022, mild nonobstructive plaque, normal abdominal aortic size. \par \par Lexiscan Myoview stress test March 2021, LVEF 63%, normal perfusion, nonischemic EKG response, no chest pain, baseline sinus rhythm NSST\par \par Echocardiogram March 2021 LVEF 65%, mild buckling mitral valve leaflet with mild MR, normal RVSP\par \par Carotid and abdominal ultrasound March 2021 mild nonobstructive plaque, normal abdominal aortic size.\par \par EKG 3/4/2021 sinus bradycardia 53 bpm\par \par Labs 7/28/2020 fasting cholesterol 162, HDL 43, , triglycerides 70, magnesium 2.1, normal LFT, CBC, BMP, TSH

## 2023-03-28 NOTE — ASSESSMENT
[FreeTextEntry1] : Nathan is an 83-year-old male with medical history detailed above and active medical issues including:\par \par - Atypical chest pain resolved, normal perfusion Myoview stress test with normal LVEF\par \par - Brief syncopal episode with fall after spending time in a hot bath while getting out of the bathtub, likely vasovagal event.  Recommended increased oral hydration with electrolyte suppliment drinks, avoid caffeine and alcohol intake.\par \par - Hypertension on amlodipine office BP at guideline goal.\par \par - Recurrent reproducible neck pain, follow-up with PCP, orthopedic surgery\par \par - Erectile dysfunction\par \par - Reflux GERD\par \par - Dizziness consistent with vertigo\par \par Patient will be seen in cardiology follow-up after noninvasive testing. \par \par Advised patient to follow active lifestyle with regular cardiovascular exercise. Patient educated on lifestyle and diet modification with low sodium low fat diet and avoidance of excessive alcohol. Patient is aware to call with any symptoms or concerns. \par \par Nathan follow-up Dr Fany Maria for for primary care.\par \par

## 2023-07-19 ENCOUNTER — APPOINTMENT (OUTPATIENT)
Dept: CARDIOLOGY | Facility: CLINIC | Age: 83
End: 2023-07-19
Payer: MEDICARE

## 2023-07-19 PROCEDURE — 93246 EXT ECG>7D<15D RECORDING: CPT

## 2023-08-21 PROBLEM — R07.89 ATYPICAL CHEST PAIN: Status: ACTIVE | Noted: 2023-01-19

## 2023-08-22 ENCOUNTER — APPOINTMENT (OUTPATIENT)
Dept: CARDIOLOGY | Facility: CLINIC | Age: 83
End: 2023-08-22
Payer: MEDICARE

## 2023-08-22 VITALS
BODY MASS INDEX: 22.26 KG/M2 | SYSTOLIC BLOOD PRESSURE: 110 MMHG | WEIGHT: 179 LBS | HEART RATE: 54 BPM | OXYGEN SATURATION: 98 % | DIASTOLIC BLOOD PRESSURE: 70 MMHG | HEIGHT: 75 IN

## 2023-08-22 DIAGNOSIS — R07.89 OTHER CHEST PAIN: ICD-10-CM

## 2023-08-22 PROCEDURE — 99214 OFFICE O/P EST MOD 30 MIN: CPT

## 2023-08-22 RX ORDER — AMLODIPINE BESYLATE 2.5 MG/1
2.5 TABLET ORAL DAILY
Qty: 90 | Refills: 1 | Status: ACTIVE | COMMUNITY
Start: 1900-01-01 | End: 1900-01-01

## 2023-08-22 NOTE — REASON FOR VISIT
[Other: ____] : [unfilled] [FreeTextEntry1] : Nathan is an 83-year-old male with history of reflux GERD, ED, depression, Lyme's, palpitations, dizziness.  Patient had a brief syncopal episode with fall after spending time in a hot bath while getting out of the bathtub, likely vasovagal event.  Recommended increased oral hydration with electrolyte suppliment drinks, avoid caffeine and alcohol intake.  Patient has dyspnea with moderate exertion unchanged.  Patient has reproducible neck pain and chest pain predominantly at rest lasting less than 1 minute.  Cardiovascular review of symptoms is negative for exertional chest pain, palpitations or syncope.  No PND or orthopnea leg edema.  No bleeding or black stool.  No exercise routine.  Patient is walking 15 minutes without exertional chest pain  Zio patch 2-week heart monitor July 2023 sinus rhythm average heart rate 57, rare PVCs, brief SVT and WCT  Lexiscan Myoview stress test March 2023 normal LVEF with normal perfusion.  Echocardiogram March 2023 normal LVEF normal Doppler  Echocardiogram March 2022 LVEF 60%, mild MR, normal RVSP.  Carotid and abdominal ultrasound March 2022, mild nonobstructive plaque, normal abdominal aortic size.   Lexiscan Myoview stress test March 2021, LVEF 63%, normal perfusion, nonischemic EKG response, no chest pain, baseline sinus rhythm NSST  Echocardiogram March 2021 LVEF 65%, mild buckling mitral valve leaflet with mild MR, normal RVSP  Carotid and abdominal ultrasound March 2021 mild nonobstructive plaque, normal abdominal aortic size.  EKG 3/4/2021 sinus bradycardia 53 bpm  Labs 7/28/2020 fasting cholesterol 162, HDL 43, , triglycerides 70, magnesium 2.1, normal LFT, CBC, BMP, TSH

## 2023-08-22 NOTE — ASSESSMENT
[FreeTextEntry1] : Nathan is an 83-year-old male with medical history detailed above and active medical issues including:  - Atypical chest pain resolved, normal perfusion Myoview stress test with normal LVEF  - Brief syncopal episode with fall after spending time in a hot bath while getting out of the bathtub, likely vasovagal event.  Recommended increased oral hydration with electrolyte suppliment drinks, avoid caffeine and alcohol intake.  - Hypertension on amlodipine office BP low normal, amlodipine reduced to 2.5 Mg daily with follow-up home resting BPs daily over the next week and a home cuff calibration in our office 1 week  - Recurrent reproducible neck pain, follow-up with PCP, orthopedic surgery  - Erectile dysfunction  - Reflux GERD  - Dizziness consistent with vertigo  Patient will be seen in cardiology follow-up 1 year same-day echocardiogram  Advised patient to follow active lifestyle with regular cardiovascular exercise. Patient educated on lifestyle and diet modification with low sodium low fat diet and avoidance of excessive alcohol. Patient is aware to call with any symptoms or concerns.   Nathan follow-up Dr Fany Maria for for primary care.  Total time spent 45 minutes, reviewing of test results, chart information, patient discussion, physical exam and completion of chart documentation.

## 2023-08-31 ENCOUNTER — APPOINTMENT (OUTPATIENT)
Dept: CARDIOLOGY | Facility: CLINIC | Age: 83
End: 2023-08-31

## 2023-08-31 VITALS
SYSTOLIC BLOOD PRESSURE: 110 MMHG | OXYGEN SATURATION: 96 % | HEART RATE: 58 BPM | DIASTOLIC BLOOD PRESSURE: 66 MMHG | BODY MASS INDEX: 21.64 KG/M2 | WEIGHT: 174 LBS | HEIGHT: 75 IN

## 2023-08-31 NOTE — DISCUSSION/SUMMARY
[FreeTextEntry1] : LINDSAY HASTINGS is a 83 year old M who presents today Aug 31, 2023 with the above history and the following active issues:  - Brief syncopal episode with fall after spending time in a hot bath while getting out of the bathtub, likely vasovagal event. Recommended increased oral hydration with electrolyte suppliment drinks, avoid caffeine and alcohol intake.  - Hypertension on amlodipine office BP at guideline goal.  - Recurrent reproducible neck pain, follow-up with PCP, orthopedic surgery  - Erectile dysfunction  - Reflux GERD  - Dizziness consistent with vertigo  Ongoing f/u with PCP.  F/U after testing to review results. Discussed red flag symptoms, which would warrant sooner or emergent medical evaluation. Any questions and concerns were addressed and resolved.  Sincerely, Elo Bolden [facility] Patient's history, testing, and plan was reviewed with supervising physician,

## 2023-08-31 NOTE — HISTORY OF PRESENT ILLNESS
[FreeTextEntry1] : LINDSAY HASTINGS is a 83 year old male with a past medical history of reflux GERD, ED, depression, Lyme's, and HTN..  Patient had a brief syncopal episode with fall after spending time in a hot bath while getting out of the bathtub, likely vasovagal event.  Last seen 8/22/23.  Testing:  ZIo 7/19/23-8/1/23: SR  bpm, average HR 57 bpm. PAC burden 4.2%. PVC burden <1%. 4 beats WVT, SVT longest 12.6 seconds.  [facility] 2/28/23: Conclusions: 1. Normal myocardial perfusion scan, with no evidence of infarction or inducible ischemia. 2. 0.5-1.5mm horizontal to upsloping ST depressions anterolaterally. Significant inferior artifact that  precludes accurate interpretation.  EKG changes suggestive of ischemia.  Echo 2/28/23: CONCLUSIONS: 1. The left ventricular systolic function is normal with an ejection fraction visually estimated at 60 %.  There is normal left ventricular diastolic function. 2. Mild mitral regurgitation. 3. Structurally normal tricuspid valve with normal leaflet excursion. Mild tricuspid regurgitation. 4. Mild pulmonic regurgitation. 5. The interatrial septum appears intact. 6. No pericardial effusion seen. 7. Compared to the transthoracic echocardiogram performed on 3/25/2022, there have been no significant  interval changes.  Labs 11/22/22: WBC 5, Hgb 14.8, HCT 45.2, plt 229, Cr 1.1, Na 138, TSH 2.02  Carotid u/s 3/25/22: Mild nonobs carotid dz seen BL.  Abd u/s 3/25/22: No AAA. Mild plauqe. Largest aortic diameter 2.7 cm.

## 2023-10-19 ENCOUNTER — NON-APPOINTMENT (OUTPATIENT)
Age: 83
End: 2023-10-19

## 2024-03-19 PROBLEM — R42 DIZZINESS: Status: ACTIVE | Noted: 2021-03-04

## 2024-03-19 PROBLEM — R55 SYNCOPE AND COLLAPSE: Status: ACTIVE | Noted: 2022-04-05

## 2024-03-19 PROBLEM — M54.9 BACK PAIN: Status: ACTIVE | Noted: 2022-07-05

## 2024-03-19 PROBLEM — R00.2 PALPITATIONS: Status: ACTIVE | Noted: 2021-03-04

## 2024-03-19 NOTE — PHYSICAL EXAM
[Well Developed] : well developed [Well Nourished] : well nourished [No Acute Distress] : no acute distress [Normal Venous Pressure] : normal venous pressure [No Carotid Bruit] : no carotid bruit [Normal S1, S2] : normal S1, S2 [No Murmur] : no murmur [No Rub] : no rub [Clear Lung Fields] : clear lung fields [No Gallop] : no gallop [No Respiratory Distress] : no respiratory distress  [Good Air Entry] : good air entry [Soft] : abdomen soft [Non Tender] : non-tender [No Masses/organomegaly] : no masses/organomegaly [Normal Bowel Sounds] : normal bowel sounds [Normal Gait] : normal gait [No Edema] : no edema [No Cyanosis] : no cyanosis [No Clubbing] : no clubbing [No Rash] : no rash [No Varicosities] : no varicosities [No Skin Lesions] : no skin lesions [Moves all extremities] : moves all extremities [No Focal Deficits] : no focal deficits [Alert and Oriented] : alert and oriented [Normal Speech] : normal speech [Normal memory] : normal memory [General Appearance - Well Developed] : well developed [Normal Appearance] : normal appearance [Well Groomed] : well groomed [No Deformities] : no deformities [General Appearance - Well Nourished] : well nourished [General Appearance - In No Acute Distress] : no acute distress [Normal Conjunctiva] : the conjunctiva exhibited no abnormalities [Normal Oral Mucosa] : normal oral mucosa [Eyelids - No Xanthelasma] : the eyelids demonstrated no xanthelasmas [No Oral Cyanosis] : no oral cyanosis [No Oral Pallor] : no oral pallor [Normal Jugular Venous A Waves Present] : normal jugular venous A waves present [Normal Jugular Venous V Waves Present] : normal jugular venous V waves present [No Jugular Venous Resendiz A Waves] : no jugular venous resendiz A waves [Auscultation Breath Sounds / Voice Sounds] : lungs were clear to auscultation bilaterally [Respiration, Rhythm And Depth] : normal respiratory rhythm and effort [Exaggerated Use Of Accessory Muscles For Inspiration] : no accessory muscle use [Heart Rate And Rhythm] : heart rate and rhythm were normal [Heart Sounds] : normal S1 and S2 [Abdomen Soft] : soft [Murmurs] : no murmurs present [Abdomen Tenderness] : non-tender [Abdomen Mass (___ Cm)] : no abdominal mass palpated [Gait - Sufficient For Exercise Testing] : the gait was sufficient for exercise testing [Abnormal Walk] : normal gait [Nail Clubbing] : no clubbing of the fingernails [Cyanosis, Localized] : no localized cyanosis [Petechial Hemorrhages (___cm)] : no petechial hemorrhages [Skin Color & Pigmentation] : normal skin color and pigmentation [No Venous Stasis] : no venous stasis [] : no rash [Skin Lesions] : no skin lesions [No Skin Ulcers] : no skin ulcer [No Xanthoma] : no  xanthoma was observed [Affect] : the affect was normal [Oriented To Time, Place, And Person] : oriented to person, place, and time [Mood] : the mood was normal [No Anxiety] : not feeling anxious

## 2024-03-26 ENCOUNTER — APPOINTMENT (OUTPATIENT)
Dept: CARDIOLOGY | Facility: CLINIC | Age: 84
End: 2024-03-26
Payer: MEDICARE

## 2024-03-26 VITALS
HEART RATE: 81 BPM | SYSTOLIC BLOOD PRESSURE: 136 MMHG | OXYGEN SATURATION: 98 % | WEIGHT: 182 LBS | BODY MASS INDEX: 22.63 KG/M2 | DIASTOLIC BLOOD PRESSURE: 64 MMHG | HEIGHT: 75 IN

## 2024-03-26 DIAGNOSIS — R55 SYNCOPE AND COLLAPSE: ICD-10-CM

## 2024-03-26 DIAGNOSIS — R00.2 PALPITATIONS: ICD-10-CM

## 2024-03-26 DIAGNOSIS — M54.9 DORSALGIA, UNSPECIFIED: ICD-10-CM

## 2024-03-26 DIAGNOSIS — R42 DIZZINESS AND GIDDINESS: ICD-10-CM

## 2024-03-26 PROCEDURE — 99214 OFFICE O/P EST MOD 30 MIN: CPT

## 2024-03-26 PROCEDURE — G2211 COMPLEX E/M VISIT ADD ON: CPT

## 2024-03-26 NOTE — ASSESSMENT
[FreeTextEntry1] : Nathan is an 84-year-old male with medical history detailed above and active medical issues including:  - Atypical chest pain resolved, normal perfusion Myoview stress test with normal LVEF March 2023.  - Brief syncopal episode with fall after spending time in a hot bath while getting out of the bathtub, likely vasovagal event.  Recommended increased oral hydration with electrolyte suppliment drinks, avoid caffeine and alcohol intake.  - Hypertension on amlodipine office BP at guideline goal.  - Recurrent reproducible neck pain, follow-up with PCP, orthopedic surgery  - Erectile dysfunction  - Reflux GERD  - Dizziness consistent with vertigo  Patient will be seen in cardiology follow-up 6 months with echocardiogram and Doppler studies  Advised patient to follow active lifestyle with regular cardiovascular exercise. Patient educated on lifestyle and diet modification with low sodium low fat diet and avoidance of excessive alcohol. Patient is aware to call with any symptoms or concerns.   Nathan follow-up Dr Fany Maria for for primary care.  Total time spent 45 minutes, reviewing of test results, chart information, patient discussion, physical exam and completion of chart documentation.

## 2024-03-26 NOTE — REASON FOR VISIT
[Other: ____] : [unfilled] [FreeTextEntry1] : Nathan is an 84-year-old male with history of reflux GERD, ED, depression, Lyme's, palpitations, dizziness.  Patient had a brief syncopal episode with fall after spending time in a hot bath while getting out of the bathtub, likely vasovagal event.  Recommended increased oral hydration with electrolyte suppliment drinks, avoid caffeine and alcohol intake.  Patient has dyspnea with moderate exertion unchanged.  Patient has reproducible neck pain and chest pain predominantly at rest lasting less than 1 minute.  Cardiovascular review of symptoms is negative for exertional chest pain, palpitations or syncope.  No PND or orthopnea leg edema.  No bleeding or black stool.  No exercise routine.  Patient is walking 15 minutes without exertional chest pain  Lexiscan Myoview stress test March 2023 normal LVEF with normal perfusion.  Echocardiogram March 2023 normal LVEF normal Doppler  Echocardiogram March 2022 LVEF 60%, mild MR, normal RVSP.  Carotid and abdominal ultrasound March 2022, mild nonobstructive plaque, normal abdominal aortic size.   Lexiscan Myoview stress test March 2021, LVEF 63%, normal perfusion, nonischemic EKG response, no chest pain, baseline sinus rhythm NSST  Echocardiogram March 2021 LVEF 65%, mild buckling mitral valve leaflet with mild MR, normal RVSP  Carotid and abdominal ultrasound March 2021 mild nonobstructive plaque, normal abdominal aortic size.  EKG 3/4/2021 sinus bradycardia 53 bpm  Labs 7/28/2020 fasting cholesterol 162, HDL 43, , triglycerides 70, magnesium 2.1, normal LFT, CBC, BMP, TSH

## 2024-08-07 ENCOUNTER — APPOINTMENT (OUTPATIENT)
Dept: ENDOCRINOLOGY | Facility: CLINIC | Age: 84
End: 2024-08-07

## 2024-10-29 ENCOUNTER — APPOINTMENT (OUTPATIENT)
Dept: CARDIOLOGY | Facility: CLINIC | Age: 84
End: 2024-10-29
Payer: MEDICARE

## 2024-10-29 ENCOUNTER — NON-APPOINTMENT (OUTPATIENT)
Age: 84
End: 2024-10-29

## 2024-10-29 VITALS
OXYGEN SATURATION: 99 % | SYSTOLIC BLOOD PRESSURE: 138 MMHG | BODY MASS INDEX: 21.88 KG/M2 | DIASTOLIC BLOOD PRESSURE: 70 MMHG | WEIGHT: 176 LBS | HEART RATE: 50 BPM | HEIGHT: 75 IN

## 2024-10-29 DIAGNOSIS — R55 SYNCOPE AND COLLAPSE: ICD-10-CM

## 2024-10-29 DIAGNOSIS — M54.2 CERVICALGIA: ICD-10-CM

## 2024-10-29 DIAGNOSIS — R00.2 PALPITATIONS: ICD-10-CM

## 2024-10-29 DIAGNOSIS — R42 DIZZINESS AND GIDDINESS: ICD-10-CM

## 2024-10-29 DIAGNOSIS — R07.89 OTHER CHEST PAIN: ICD-10-CM

## 2024-10-29 DIAGNOSIS — I73.9 PERIPHERAL VASCULAR DISEASE, UNSPECIFIED: ICD-10-CM

## 2024-10-29 PROCEDURE — G2211 COMPLEX E/M VISIT ADD ON: CPT

## 2024-10-29 PROCEDURE — 99215 OFFICE O/P EST HI 40 MIN: CPT

## 2024-10-29 PROCEDURE — 93978 VASCULAR STUDY: CPT

## 2024-10-29 PROCEDURE — 93880 EXTRACRANIAL BILAT STUDY: CPT

## 2024-10-29 PROCEDURE — 93306 TTE W/DOPPLER COMPLETE: CPT

## 2025-04-01 ENCOUNTER — APPOINTMENT (OUTPATIENT)
Dept: CARDIOLOGY | Facility: CLINIC | Age: 85
End: 2025-04-01
Payer: MEDICARE

## 2025-04-01 VITALS
DIASTOLIC BLOOD PRESSURE: 72 MMHG | OXYGEN SATURATION: 99 % | SYSTOLIC BLOOD PRESSURE: 126 MMHG | WEIGHT: 184 LBS | HEIGHT: 75 IN | BODY MASS INDEX: 22.88 KG/M2 | HEART RATE: 48 BPM

## 2025-04-01 DIAGNOSIS — M54.9 DORSALGIA, UNSPECIFIED: ICD-10-CM

## 2025-04-01 DIAGNOSIS — M54.2 CERVICALGIA: ICD-10-CM

## 2025-04-01 DIAGNOSIS — S32.030A WEDGE COMPRESSION FRACTURE OF THIRD LUMBAR VERTEBRA, INITIAL ENCOUNTER FOR CLOSED FRACTURE: ICD-10-CM

## 2025-04-01 DIAGNOSIS — R55 SYNCOPE AND COLLAPSE: ICD-10-CM

## 2025-04-01 DIAGNOSIS — R42 DIZZINESS AND GIDDINESS: ICD-10-CM

## 2025-04-01 DIAGNOSIS — R00.2 PALPITATIONS: ICD-10-CM

## 2025-04-01 DIAGNOSIS — I73.9 PERIPHERAL VASCULAR DISEASE, UNSPECIFIED: ICD-10-CM

## 2025-04-01 PROCEDURE — 99215 OFFICE O/P EST HI 40 MIN: CPT

## 2025-04-01 RX ORDER — ERGOCALCIFEROL 1.25 MG/1
1.25 MG CAPSULE, LIQUID FILLED ORAL
Refills: 0 | Status: ACTIVE | COMMUNITY

## 2025-06-25 ENCOUNTER — APPOINTMENT (OUTPATIENT)
Dept: CARDIOLOGY | Facility: CLINIC | Age: 85
End: 2025-06-25
Payer: MEDICARE

## 2025-06-25 VITALS
SYSTOLIC BLOOD PRESSURE: 108 MMHG | WEIGHT: 178 LBS | BODY MASS INDEX: 22.13 KG/M2 | HEART RATE: 66 BPM | HEIGHT: 75 IN | OXYGEN SATURATION: 97 % | DIASTOLIC BLOOD PRESSURE: 60 MMHG

## 2025-06-25 PROCEDURE — 99213 OFFICE O/P EST LOW 20 MIN: CPT

## 2025-08-04 ENCOUNTER — NON-APPOINTMENT (OUTPATIENT)
Age: 85
End: 2025-08-04

## 2025-08-06 ENCOUNTER — APPOINTMENT (OUTPATIENT)
Dept: CARDIOLOGY | Facility: CLINIC | Age: 85
End: 2025-08-06
Payer: MEDICARE

## 2025-08-06 VITALS
HEIGHT: 78 IN | OXYGEN SATURATION: 100 % | HEART RATE: 62 BPM | WEIGHT: 176 LBS | SYSTOLIC BLOOD PRESSURE: 132 MMHG | DIASTOLIC BLOOD PRESSURE: 72 MMHG | BODY MASS INDEX: 20.36 KG/M2

## 2025-08-06 DIAGNOSIS — R00.2 PALPITATIONS: ICD-10-CM

## 2025-08-06 DIAGNOSIS — R55 SYNCOPE AND COLLAPSE: ICD-10-CM

## 2025-08-06 DIAGNOSIS — R42 DIZZINESS AND GIDDINESS: ICD-10-CM

## 2025-08-06 DIAGNOSIS — I73.9 PERIPHERAL VASCULAR DISEASE, UNSPECIFIED: ICD-10-CM

## 2025-08-06 DIAGNOSIS — Z00.00 ENCOUNTER FOR GENERAL ADULT MEDICAL EXAMINATION W/OUT ABNORMAL FINDINGS: ICD-10-CM

## 2025-08-06 DIAGNOSIS — R07.89 OTHER CHEST PAIN: ICD-10-CM

## 2025-08-06 PROCEDURE — 99215 OFFICE O/P EST HI 40 MIN: CPT

## 2025-08-06 PROCEDURE — 93306 TTE W/DOPPLER COMPLETE: CPT

## 2025-08-10 LAB — HBA1C MFR BLD HPLC: 5.9
